# Patient Record
Sex: FEMALE | Race: WHITE | NOT HISPANIC OR LATINO | URBAN - METROPOLITAN AREA
[De-identification: names, ages, dates, MRNs, and addresses within clinical notes are randomized per-mention and may not be internally consistent; named-entity substitution may affect disease eponyms.]

---

## 2018-10-26 ENCOUNTER — EMERGENCY (EMERGENCY)
Facility: HOSPITAL | Age: 58
LOS: 1 days | Discharge: HOME | End: 2018-10-26
Admitting: EMERGENCY MEDICINE
Payer: MEDICARE

## 2018-10-26 VITALS
OXYGEN SATURATION: 100 % | SYSTOLIC BLOOD PRESSURE: 190 MMHG | RESPIRATION RATE: 16 BRPM | DIASTOLIC BLOOD PRESSURE: 104 MMHG | HEART RATE: 108 BPM | TEMPERATURE: 98 F

## 2018-10-26 VITALS
HEART RATE: 86 BPM | SYSTOLIC BLOOD PRESSURE: 178 MMHG | RESPIRATION RATE: 18 BRPM | DIASTOLIC BLOOD PRESSURE: 101 MMHG | OXYGEN SATURATION: 98 %

## 2018-10-26 PROCEDURE — 99283 EMERGENCY DEPT VISIT LOW MDM: CPT

## 2018-10-26 RX ORDER — HYDROXYZINE HCL 10 MG
50 TABLET ORAL ONCE
Qty: 0 | Refills: 0 | Status: COMPLETED | OUTPATIENT
Start: 2018-10-26 | End: 2018-10-26

## 2018-10-26 RX ADMIN — Medication 50 MILLIGRAM(S): at 14:20

## 2018-10-26 NOTE — ED PROVIDER NOTE - MEDICAL DECISION MAKING DETAILS
57 y/o F hx Anxiety D/O, Panic Attack   Tolerated medication well. Medical evaluation performed. There is no clinical evidence of intoxication or any acute medical problem requiring immediate intervention.  Recommend following with [facility].

## 2018-10-26 NOTE — ED BEHAVIORAL HEALTH NOTE - BEHAVIORAL HEALTH NOTE
Writer called [facility], [phone], to arrange livery service, as requested by the evaluating clinician, and spoke with Mckay, who provided confirmation # [id number], for service to be provided by [address] [place] [address], [phone], with an ETA of 6:30PM.

## 2018-10-26 NOTE — ED ADULT NURSE REASSESSMENT NOTE - NS ED NURSE REASSESS COMMENT FT1
pt calm & cooperative d/c by NP pt verbalizing understanding of d/c instructions pt denies Si/Hi/AVh presently.

## 2018-10-26 NOTE — ED ADULT NURSE NOTE - OBJECTIVE STATEMENT
Received pt in  pt c/o panic attack & increasing anxiety pt denies Si/Hi/AVh presently emotional reassurance provided safety & comfort measures maintained eval on going.

## 2018-10-26 NOTE — ED ADULT TRIAGE NOTE - CHIEF COMPLAINT QUOTE
pt bibems from home,  called 911 because pt started having a panic attack one hour ago, pt feels very anxious, fearful, and tense. feels similar to previous panic attacks but worse, hypertensive in triage 190/104. took 0.5 mg of clonopin prior to coming to ED. no cp, no sob. no n/v. no si/hi, av/hv, no alcohol. pmhx: anxiety, asthma, htn

## 2018-10-26 NOTE — ED PROVIDER NOTE - OBJECTIVE STATEMENT
57 y/o F hx Anxiety D/O, Panic Attack   presents to ER w c/o intermittent anxiousness  and panic attacks. Admit to taking Klopopin PRN for anxiety.  Denies falling, punching or kicking any objects. Denies SI/HI/AH/VH.  Denies pain, SOB, fever, chills, chest/abdominal discomfort. Denies recent use of alcohol or illicit drugs.  Denies recent use of  illicit drugs.

## 2019-03-01 ENCOUNTER — OUTPATIENT (OUTPATIENT)
Dept: OUTPATIENT SERVICES | Facility: HOSPITAL | Age: 59
LOS: 1 days | Discharge: HOME | End: 2019-03-01

## 2019-03-01 PROBLEM — F41.0 PANIC DISORDER [EPISODIC PAROXYSMAL ANXIETY]: Chronic | Status: ACTIVE | Noted: 2018-11-04

## 2019-03-04 DIAGNOSIS — F40.01 AGORAPHOBIA WITH PANIC DISORDER: ICD-10-CM

## 2019-03-04 DIAGNOSIS — F32.9 MAJOR DEPRESSIVE DISORDER, SINGLE EPISODE, UNSPECIFIED: ICD-10-CM

## 2019-03-04 DIAGNOSIS — F60.9 PERSONALITY DISORDER, UNSPECIFIED: ICD-10-CM

## 2019-11-19 ENCOUNTER — OUTPATIENT (OUTPATIENT)
Dept: OUTPATIENT SERVICES | Facility: HOSPITAL | Age: 59
LOS: 1 days | Discharge: HOME | End: 2019-11-19

## 2023-05-01 NOTE — ED ADULT NURSE NOTE - EXTENSIONS OF SELF_ADULT
None Same Histology In Subsequent Stages Text: The pattern and morphology of the tumor is as described in the first stage.

## 2024-03-26 ENCOUNTER — APPOINTMENT (OUTPATIENT)
Dept: INTERNAL MEDICINE | Facility: CLINIC | Age: 64
End: 2024-03-26

## 2024-05-07 ENCOUNTER — APPOINTMENT (OUTPATIENT)
Dept: INTERNAL MEDICINE | Facility: CLINIC | Age: 64
End: 2024-05-07